# Patient Record
Sex: FEMALE | Race: WHITE | NOT HISPANIC OR LATINO
[De-identification: names, ages, dates, MRNs, and addresses within clinical notes are randomized per-mention and may not be internally consistent; named-entity substitution may affect disease eponyms.]

---

## 2019-07-09 ENCOUNTER — APPOINTMENT (OUTPATIENT)
Dept: ENDOCRINOLOGY | Facility: CLINIC | Age: 44
End: 2019-07-09
Payer: COMMERCIAL

## 2019-07-09 VITALS
WEIGHT: 248 LBS | DIASTOLIC BLOOD PRESSURE: 129 MMHG | HEART RATE: 94 BPM | SYSTOLIC BLOOD PRESSURE: 170 MMHG | BODY MASS INDEX: 43.94 KG/M2 | TEMPERATURE: 98.7 F | HEIGHT: 63 IN | OXYGEN SATURATION: 98 % | RESPIRATION RATE: 12 BRPM

## 2019-07-09 VITALS — DIASTOLIC BLOOD PRESSURE: 80 MMHG | SYSTOLIC BLOOD PRESSURE: 140 MMHG

## 2019-07-09 DIAGNOSIS — Z78.9 OTHER SPECIFIED HEALTH STATUS: ICD-10-CM

## 2019-07-09 DIAGNOSIS — R73.03 PREDIABETES.: ICD-10-CM

## 2019-07-09 DIAGNOSIS — E03.9 HYPOTHYROIDISM, UNSPECIFIED: ICD-10-CM

## 2019-07-09 PROCEDURE — 99244 OFF/OP CNSLTJ NEW/EST MOD 40: CPT

## 2019-07-15 ENCOUNTER — RX RENEWAL (OUTPATIENT)
Age: 44
End: 2019-07-15

## 2019-07-15 DIAGNOSIS — E55.9 VITAMIN D DEFICIENCY, UNSPECIFIED: ICD-10-CM

## 2019-07-15 LAB
25(OH)D3 SERPL-MCNC: 11.7 NG/ML
ALBUMIN SERPL ELPH-MCNC: 4.3 G/DL
ALP BLD-CCNC: 63 U/L
ALT SERPL-CCNC: 25 U/L
ANION GAP SERPL CALC-SCNC: 15 MMOL/L
AST SERPL-CCNC: 33 U/L
BILIRUB SERPL-MCNC: 0.2 MG/DL
BUN SERPL-MCNC: 11 MG/DL
CALCIUM SERPL-MCNC: 9.7 MG/DL
CHLORIDE SERPL-SCNC: 100 MMOL/L
CHOLEST SERPL-MCNC: 225 MG/DL
CHOLEST/HDLC SERPL: 4.2 RATIO
CO2 SERPL-SCNC: 25 MMOL/L
CREAT SERPL-MCNC: 0.74 MG/DL
ESTIMATED AVERAGE GLUCOSE: 134 MG/DL
GLUCOSE SERPL-MCNC: 103 MG/DL
HBA1C MFR BLD HPLC: 6.3 %
HDLC SERPL-MCNC: 54 MG/DL
LDLC SERPL CALC-MCNC: 121 MG/DL
POTASSIUM SERPL-SCNC: 4.3 MMOL/L
PROT SERPL-MCNC: 7.9 G/DL
SODIUM SERPL-SCNC: 140 MMOL/L
T4 FREE SERPL-MCNC: 1 NG/DL
TRIGL SERPL-MCNC: 252 MG/DL
TSH SERPL-ACNC: 3.26 UIU/ML

## 2020-12-09 ENCOUNTER — TRANSCRIPTION ENCOUNTER (OUTPATIENT)
Age: 45
End: 2020-12-09

## 2020-12-09 ENCOUNTER — APPOINTMENT (OUTPATIENT)
Dept: ULTRASOUND IMAGING | Facility: CLINIC | Age: 45
End: 2020-12-09
Payer: COMMERCIAL

## 2020-12-09 ENCOUNTER — OUTPATIENT (OUTPATIENT)
Dept: OUTPATIENT SERVICES | Facility: HOSPITAL | Age: 45
LOS: 1 days | End: 2020-12-09
Payer: COMMERCIAL

## 2020-12-09 ENCOUNTER — RESULT REVIEW (OUTPATIENT)
Age: 45
End: 2020-12-09

## 2020-12-09 DIAGNOSIS — N92.6 IRREGULAR MENSTRUATION, UNSPECIFIED: ICD-10-CM

## 2020-12-09 PROCEDURE — 76831 ECHO EXAM UTERUS: CPT | Mod: 26

## 2020-12-09 PROCEDURE — 58340 CATHETER FOR HYSTEROGRAPHY: CPT

## 2020-12-09 PROCEDURE — 76831 ECHO EXAM UTERUS: CPT

## 2021-07-12 ENCOUNTER — OUTPATIENT (OUTPATIENT)
Dept: OUTPATIENT SERVICES | Facility: HOSPITAL | Age: 46
LOS: 1 days | End: 2021-07-12
Payer: COMMERCIAL

## 2021-07-12 VITALS
TEMPERATURE: 98 F | HEART RATE: 96 BPM | RESPIRATION RATE: 18 BRPM | DIASTOLIC BLOOD PRESSURE: 98 MMHG | OXYGEN SATURATION: 99 % | WEIGHT: 251.11 LBS | HEIGHT: 63 IN | SYSTOLIC BLOOD PRESSURE: 140 MMHG

## 2021-07-12 DIAGNOSIS — Z01.818 ENCOUNTER FOR OTHER PREPROCEDURAL EXAMINATION: ICD-10-CM

## 2021-07-12 DIAGNOSIS — N84.0 POLYP OF CORPUS UTERI: ICD-10-CM

## 2021-07-12 DIAGNOSIS — N92.0 EXCESSIVE AND FREQUENT MENSTRUATION WITH REGULAR CYCLE: ICD-10-CM

## 2021-07-12 DIAGNOSIS — I10 ESSENTIAL (PRIMARY) HYPERTENSION: ICD-10-CM

## 2021-07-12 LAB
BLD GP AB SCN SERPL QL: NEGATIVE — SIGNIFICANT CHANGE UP
HCT VFR BLD CALC: 42.4 % — SIGNIFICANT CHANGE UP (ref 34.5–45)
HGB BLD-MCNC: 13.6 G/DL — SIGNIFICANT CHANGE UP (ref 11.5–15.5)
MCHC RBC-ENTMCNC: 27.1 PG — SIGNIFICANT CHANGE UP (ref 27–34)
MCHC RBC-ENTMCNC: 32.1 GM/DL — SIGNIFICANT CHANGE UP (ref 32–36)
MCV RBC AUTO: 84.6 FL — SIGNIFICANT CHANGE UP (ref 80–100)
NRBC # BLD: 0 /100 WBCS — SIGNIFICANT CHANGE UP (ref 0–0)
PLATELET # BLD AUTO: 343 K/UL — SIGNIFICANT CHANGE UP (ref 150–400)
RBC # BLD: 5.01 M/UL — SIGNIFICANT CHANGE UP (ref 3.8–5.2)
RBC # FLD: 13.3 % — SIGNIFICANT CHANGE UP (ref 10.3–14.5)
RH IG SCN BLD-IMP: POSITIVE — SIGNIFICANT CHANGE UP
WBC # BLD: 8.7 K/UL — SIGNIFICANT CHANGE UP (ref 3.8–10.5)
WBC # FLD AUTO: 8.7 K/UL — SIGNIFICANT CHANGE UP (ref 3.8–10.5)

## 2021-07-12 PROCEDURE — G0463: CPT

## 2021-07-12 PROCEDURE — 86900 BLOOD TYPING SEROLOGIC ABO: CPT

## 2021-07-12 PROCEDURE — 86901 BLOOD TYPING SEROLOGIC RH(D): CPT

## 2021-07-12 PROCEDURE — 85027 COMPLETE CBC AUTOMATED: CPT

## 2021-07-12 PROCEDURE — 86850 RBC ANTIBODY SCREEN: CPT

## 2021-07-12 RX ORDER — SODIUM CHLORIDE 9 MG/ML
3 INJECTION INTRAMUSCULAR; INTRAVENOUS; SUBCUTANEOUS EVERY 8 HOURS
Refills: 0 | Status: DISCONTINUED | OUTPATIENT
Start: 2021-07-15 | End: 2021-07-30

## 2021-07-12 RX ORDER — LIDOCAINE HCL 20 MG/ML
0.2 VIAL (ML) INJECTION ONCE
Refills: 0 | Status: DISCONTINUED | OUTPATIENT
Start: 2021-07-15 | End: 2021-07-30

## 2021-07-12 RX ORDER — CEFOTETAN DISODIUM 1 G
2 VIAL (EA) INJECTION ONCE
Refills: 0 | Status: DISCONTINUED | OUTPATIENT
Start: 2021-07-15 | End: 2021-07-30

## 2021-07-12 NOTE — H&P PST ADULT - ACTIVITY
walking on treadmill or outside walking on treadmill or outside, ADL's, Can walk up a flight of stairs

## 2021-07-12 NOTE — H&P PST ADULT - LAST ECHOCARDIOGRAM
possibly 2 years ago; follows with a cardiologist for HTN and was having palpitations possibly 2 years ago; follows with a cardiologist for HTN and palpitations

## 2021-07-12 NOTE — H&P PST ADULT - RX
unable to tolerate CPAP machine; pt followed up with a dentist who prescribed a nightguard with improvement

## 2021-07-12 NOTE — H&P PST ADULT - NSICDXPASTMEDICALHX_GEN_ALL_CORE_FT
PAST MEDICAL HISTORY:  GERD (gastroesophageal reflux disease)     Gestational Hypertension     Hypertension     Irritable bowel syndrome

## 2021-07-12 NOTE — H&P PST ADULT - HISTORY OF PRESENT ILLNESS
45 year old female with PMH HTN presents with heavy menses with clots for long periods of time x 1.5 years s/p abdominal sonogram and hysteroscopy which revealed an ovarian polyp. Pt scheduled to have a hysteroscopy, D&C and polypectomy removal.    2nd dose Pfizer covid vaccine 5/7/2021  Denies Recent travel, Exposure or Covid symptoms    45 year old female with PMH HTN presents with heavy menses with clots for prolonged duration x 1.5 years s/p abdominal sonogram and hysteroscopy which revealed an ovarian polyp. Pt scheduled to have a hysteroscopy, D&C and polypectomy removal.    2nd dose Pfizer covid vaccine 5/7/2021  Denies Recent travel, Exposure or Covid symptoms

## 2021-07-14 ENCOUNTER — TRANSCRIPTION ENCOUNTER (OUTPATIENT)
Age: 46
End: 2021-07-14

## 2021-07-15 ENCOUNTER — RESULT REVIEW (OUTPATIENT)
Age: 46
End: 2021-07-15

## 2021-07-15 ENCOUNTER — OUTPATIENT (OUTPATIENT)
Dept: INPATIENT UNIT | Facility: HOSPITAL | Age: 46
LOS: 1 days | End: 2021-07-15
Payer: COMMERCIAL

## 2021-07-15 VITALS
RESPIRATION RATE: 16 BRPM | HEIGHT: 63 IN | TEMPERATURE: 99 F | HEART RATE: 101 BPM | DIASTOLIC BLOOD PRESSURE: 89 MMHG | SYSTOLIC BLOOD PRESSURE: 137 MMHG | OXYGEN SATURATION: 100 % | WEIGHT: 251.11 LBS

## 2021-07-15 VITALS
RESPIRATION RATE: 16 BRPM | TEMPERATURE: 98 F | HEART RATE: 86 BPM | OXYGEN SATURATION: 100 % | DIASTOLIC BLOOD PRESSURE: 59 MMHG | SYSTOLIC BLOOD PRESSURE: 113 MMHG

## 2021-07-15 DIAGNOSIS — N93.9 ABNORMAL UTERINE AND VAGINAL BLEEDING, UNSPECIFIED: ICD-10-CM

## 2021-07-15 DIAGNOSIS — N84.0 POLYP OF CORPUS UTERI: ICD-10-CM

## 2021-07-15 DIAGNOSIS — Z01.818 ENCOUNTER FOR OTHER PREPROCEDURAL EXAMINATION: ICD-10-CM

## 2021-07-15 LAB — HCG UR QL: NEGATIVE — SIGNIFICANT CHANGE UP

## 2021-07-15 PROCEDURE — 88305 TISSUE EXAM BY PATHOLOGIST: CPT | Mod: 26

## 2021-07-15 PROCEDURE — 88305 TISSUE EXAM BY PATHOLOGIST: CPT

## 2021-07-15 PROCEDURE — 58558 HYSTEROSCOPY BIOPSY: CPT

## 2021-07-15 PROCEDURE — 81025 URINE PREGNANCY TEST: CPT

## 2021-07-15 RX ORDER — OLMESARTAN MEDOXOMIL 5 MG/1
1 TABLET, FILM COATED ORAL
Qty: 0 | Refills: 0 | DISCHARGE

## 2021-07-15 RX ORDER — ACETAMINOPHEN 500 MG
2 TABLET ORAL
Qty: 0 | Refills: 0 | DISCHARGE

## 2021-07-15 RX ORDER — HYDROMORPHONE HYDROCHLORIDE 2 MG/ML
0.25 INJECTION INTRAMUSCULAR; INTRAVENOUS; SUBCUTANEOUS
Refills: 0 | Status: DISCONTINUED | OUTPATIENT
Start: 2021-07-15 | End: 2021-07-15

## 2021-07-15 RX ORDER — FUROSEMIDE 40 MG
20 TABLET ORAL
Qty: 0 | Refills: 0 | DISCHARGE

## 2021-07-15 RX ORDER — NEBIVOLOL HYDROCHLORIDE 5 MG/1
1 TABLET ORAL
Qty: 0 | Refills: 0 | DISCHARGE

## 2021-07-15 RX ORDER — IBUPROFEN 200 MG
3 TABLET ORAL
Qty: 0 | Refills: 0 | DISCHARGE

## 2021-07-15 NOTE — BRIEF OPERATIVE NOTE - OPERATION/FINDINGS
EUA: anteverted, mobile 7 week uterus. No adnexal masses palpated  Hysteroscopy: fundus and bilateral ostia visualized. Polypoid endometrium appreciated.

## 2021-07-15 NOTE — BRIEF OPERATIVE NOTE - NSICDXBRIEFPOSTOP_GEN_ALL_CORE_FT
POST-OP DIAGNOSIS:  Abnormal uterine bleeding due to endometrial polyp 15-Jul-2021 16:11:13  Heidy Winn

## 2021-07-15 NOTE — BRIEF OPERATIVE NOTE - NSICDXBRIEFPROCEDURE_GEN_ALL_CORE_FT
PROCEDURES:  Dilation and curettage, with uterine polypectomy and video hysteroscopy 15-Jul-2021 16:10:03  Heidy Winn

## 2021-07-15 NOTE — BRIEF OPERATIVE NOTE - NSICDXBRIEFPREOP_GEN_ALL_CORE_FT
PRE-OP DIAGNOSIS:  Abnormal uterine bleeding due to endometrial polyp 15-Jul-2021 16:10:22  Heidy Winn

## 2021-07-15 NOTE — ASU DISCHARGE PLAN (ADULT/PEDIATRIC) - CARE PROVIDER_API CALL
Rogelio Chapman)  Medicine  Critical Care  [address] [facility], [place] [address]  Phone: [phone]  Fax: [phone]  Follow Up Time: 2 weeks

## 2021-07-20 LAB — SURGICAL PATHOLOGY STUDY: SIGNIFICANT CHANGE UP

## 2021-08-06 PROBLEM — K58.9 IRRITABLE BOWEL SYNDROME WITHOUT DIARRHEA: Chronic | Status: ACTIVE | Noted: 2021-07-12

## 2021-08-06 PROBLEM — K21.9 GASTRO-ESOPHAGEAL REFLUX DISEASE WITHOUT ESOPHAGITIS: Chronic | Status: ACTIVE | Noted: 2021-07-12

## 2021-08-06 PROBLEM — I10 ESSENTIAL (PRIMARY) HYPERTENSION: Chronic | Status: ACTIVE | Noted: 2021-07-12

## 2021-08-06 PROBLEM — K58.9 IRRITABLE BOWEL SYNDROME, UNSPECIFIED: Chronic | Status: ACTIVE | Noted: 2021-07-12

## 2021-08-19 ENCOUNTER — APPOINTMENT (OUTPATIENT)
Dept: PULMONOLOGY | Facility: CLINIC | Age: 46
End: 2021-08-19
Payer: COMMERCIAL

## 2021-08-19 VITALS
SYSTOLIC BLOOD PRESSURE: 119 MMHG | OXYGEN SATURATION: 100 % | HEIGHT: 63 IN | HEART RATE: 95 BPM | TEMPERATURE: 97 F | WEIGHT: 248 LBS | RESPIRATION RATE: 16 BRPM | BODY MASS INDEX: 43.94 KG/M2 | DIASTOLIC BLOOD PRESSURE: 80 MMHG

## 2021-08-19 DIAGNOSIS — Z87.01 PERSONAL HISTORY OF PNEUMONIA (RECURRENT): ICD-10-CM

## 2021-08-19 DIAGNOSIS — Z86.39 PERSONAL HISTORY OF OTHER ENDOCRINE, NUTRITIONAL AND METABOLIC DISEASE: ICD-10-CM

## 2021-08-19 DIAGNOSIS — Z86.79 PERSONAL HISTORY OF OTHER DISEASES OF THE CIRCULATORY SYSTEM: ICD-10-CM

## 2021-08-19 PROCEDURE — 99204 OFFICE O/P NEW MOD 45 MIN: CPT

## 2021-08-19 RX ORDER — NEBIVOLOL HYDROCHLORIDE 20 MG/1
20 TABLET ORAL DAILY
Refills: 0 | Status: COMPLETED | COMMUNITY
Start: 2019-07-09 | End: 2021-08-19

## 2021-08-19 NOTE — DISCUSSION/SUMMARY
[FreeTextEntry1] : She is a 46 year old woman with a history of JENNA diagnosed many years ago. She could not tolerate CPAP and was given a MAD by [facility]. Was told she needs a new MAD and should have a PSG. \par \par Will obtain a PSG. \par \par Further recommendations to follow the results of the above. \par \par \par \par

## 2021-08-19 NOTE — REVIEW OF SYSTEMS
[Thyroid Problem] : thyroid problem [Fatigue] : no fatigue [Sinus Problems] : no sinus problems [Dyspnea] : no dyspnea [Chest Discomfort] : no chest discomfort [Edema] : no edema [Nasal Discharge] : no nasal discharge [GERD] : no gerd [Rash] : no rash [Back Pain] : no back pain [Anemia] : no anemia [Headache] : no headache [Diabetes] : no diabetes

## 2021-08-19 NOTE — HISTORY OF PRESENT ILLNESS
[Never] : never [Obstructive Sleep Apnea] : obstructive sleep apnea [Fatigue] : fatigue [Oral Appliance] : oral appliance [TextBox_4] : Has h/o JENNA diagnosed many years ago. Could not tolerate CPAP. Has MAD by [facility]. Was told she needs a new MAD and should have a PSG. \par \par \par \par  [Difficulty Maintaining Sleep] : does not have difficulty maintaining sleep

## 2021-08-19 NOTE — PHYSICAL EXAM
[No Acute Distress] : no acute distress [II] : Mallampati Class: II [No Neck Mass] : no neck mass [Normal S1, S2] : normal s1, s2 [Clear to Auscultation Bilaterally] : clear to auscultation bilaterally [No HSM] : no hsm [Normal Gait] : normal gait [No Cyanosis] : no cyanosis [Normal Turgor] : normal turgor [No Focal Deficits] : no focal deficits [Oriented x3] : oriented x3

## 2022-03-31 ENCOUNTER — NON-APPOINTMENT (OUTPATIENT)
Age: 47
End: 2022-03-31

## 2022-04-01 ENCOUNTER — APPOINTMENT (OUTPATIENT)
Dept: PULMONOLOGY | Facility: CLINIC | Age: 47
End: 2022-04-01
Payer: COMMERCIAL

## 2022-04-01 VITALS
SYSTOLIC BLOOD PRESSURE: 132 MMHG | BODY MASS INDEX: 40.92 KG/M2 | OXYGEN SATURATION: 99 % | WEIGHT: 231 LBS | TEMPERATURE: 96.1 F | HEART RATE: 84 BPM | DIASTOLIC BLOOD PRESSURE: 84 MMHG

## 2022-04-01 PROCEDURE — 99215 OFFICE O/P EST HI 40 MIN: CPT

## 2022-04-01 RX ORDER — OLMESARTAN MEDOXOMIL 40 MG/1
TABLET, FILM COATED ORAL
Refills: 0 | Status: COMPLETED | COMMUNITY
End: 2022-04-01

## 2022-04-01 RX ORDER — NEBIVOLOL HYDROCHLORIDE 20 MG/1
20 TABLET ORAL
Refills: 0 | Status: ACTIVE | COMMUNITY

## 2022-04-01 NOTE — DISCUSSION/SUMMARY
[FreeTextEntry1] : She is a 46 year old woman with a history of JENNA diagnosed many years ago. She could not tolerate CPAP and was given a MAD by [facility]. Was told she needs a new MAD and should have a PSG. \par \par Was advised PSG but could not have one. Will obtain HSAT.\par \par Weight loss and exercise were advised. \par \par Further recommendations to follow the results of the above. \par \par \par \par \par \par \par

## 2022-04-01 NOTE — PHYSICAL EXAM
[No Acute Distress] : no acute distress [No Neck Mass] : no neck mass [Normal S1, S2] : normal s1, s2 [Clear to Auscultation Bilaterally] : clear to auscultation bilaterally [No HSM] : no hsm [Normal Gait] : normal gait [No Cyanosis] : no cyanosis [Normal Turgor] : normal turgor [No Focal Deficits] : no focal deficits [Oriented x3] : oriented x3 [III] : Mallampati Class: III

## 2022-04-01 NOTE — REVIEW OF SYSTEMS
[Fatigue] : no fatigue [Sinus Problems] : no sinus problems [Dyspnea] : no dyspnea [Chest Discomfort] : no chest discomfort [Edema] : no edema [Nasal Discharge] : no nasal discharge [GERD] : no gerd [Back Pain] : no back pain [Rash] : no rash [Anemia] : no anemia [Headache] : no headache [Anxiety] : no anxiety [Diabetes] : no diabetes

## 2022-04-01 NOTE — HISTORY OF PRESENT ILLNESS
[Never] : never [Obstructive Sleep Apnea] : obstructive sleep apnea [Fatigue] : fatigue [Oral Appliance] : oral appliance [Snoring] : snoring [TextBox_4] : Has h/o JENNA diagnosed many years ago. Could not tolerate CPAP. Has MAD by [facility]. Was told she needs a new MAD and should have a PSG. \par \par Did not have sleep study. \par \par Has not been using her MAD. Needs a new one. \par \par Feels sleepy during the day. Also snores. \par \par \par \par \par \par  [Difficulty Maintaining Sleep] : does not have difficulty maintaining sleep [Tired while Driving] : not tired while driving

## 2022-07-21 ENCOUNTER — APPOINTMENT (OUTPATIENT)
Dept: PULMONOLOGY | Facility: CLINIC | Age: 47
End: 2022-07-21

## 2022-07-21 VITALS
WEIGHT: 231 LBS | BODY MASS INDEX: 40.92 KG/M2 | DIASTOLIC BLOOD PRESSURE: 98 MMHG | SYSTOLIC BLOOD PRESSURE: 160 MMHG | HEART RATE: 94 BPM | TEMPERATURE: 97.1 F | OXYGEN SATURATION: 97 %

## 2022-07-21 VITALS — SYSTOLIC BLOOD PRESSURE: 146 MMHG | DIASTOLIC BLOOD PRESSURE: 92 MMHG

## 2022-07-21 PROCEDURE — 94727 GAS DIL/WSHOT DETER LNG VOL: CPT

## 2022-07-21 PROCEDURE — 94060 EVALUATION OF WHEEZING: CPT

## 2022-07-21 PROCEDURE — 99214 OFFICE O/P EST MOD 30 MIN: CPT | Mod: 25

## 2022-07-21 PROCEDURE — 94729 DIFFUSING CAPACITY: CPT

## 2022-07-21 RX ORDER — OLMESARTAN MEDOXOMIL AND HYDROCHLOROTHIAZIDE 40; 12.5 MG/1; MG/1
40-12.5 TABLET ORAL
Qty: 30 | Refills: 0 | Status: ACTIVE | COMMUNITY
Start: 2022-06-23

## 2022-07-21 RX ORDER — OLMESARTAN MEDOXOMIL 40 MG/1
40 TABLET, FILM COATED ORAL
Refills: 0 | Status: DISCONTINUED | COMMUNITY
End: 2022-07-21

## 2022-07-21 NOTE — PHYSICAL EXAM
[No Acute Distress] : no acute distress [III] : Mallampati Class: III [No Neck Mass] : no neck mass [Normal S1, S2] : normal s1, s2 [Clear to Auscultation Bilaterally] : clear to auscultation bilaterally [No HSM] : no hsm [Normal Gait] : normal gait [No Cyanosis] : no cyanosis [Normal Turgor] : normal turgor [No Focal Deficits] : no focal deficits [Oriented x3] : oriented x3 [No Edema] : no edema [Normal Affect] : normal affect

## 2022-07-21 NOTE — REVIEW OF SYSTEMS
[Fever] : no fever [Fatigue] : no fatigue [Nasal Congestion] : no nasal congestion [Sinus Problems] : no sinus problems [Chest Discomfort] : no chest discomfort [Dyspnea] : no dyspnea [Edema] : no edema [Nasal Discharge] : no nasal discharge [GERD] : no gerd [Myalgias] : no myalgias [Back Pain] : no back pain [Rash] : no rash [Anemia] : no anemia [Headache] : no headache [Anxiety] : no anxiety [Diabetes] : no diabetes

## 2022-07-21 NOTE — HISTORY OF PRESENT ILLNESS
[Never] : never [Obstructive Sleep Apnea] : obstructive sleep apnea [Fatigue] : fatigue [Snoring] : snoring [Oral Appliance] : oral appliance [TextBox_4] : She is a 46 year old woman with a history of JENNA diagnosed many years ago. She could not tolerate CPAP and was given a MAD by [facility]. It became damaged and she needs a new one. \par \par \par \par  [Difficulty Maintaining Sleep] : does not have difficulty maintaining sleep [Tired while Driving] : not tired while driving

## 2022-07-21 NOTE — DISCUSSION/SUMMARY
[FreeTextEntry1] : She is a 46 year old woman with a history of JENNA diagnosed many years ago. She could not tolerate CPAP and was given a MAD by [facility]. \par \par HSAT 6/26/22: MARIELENA 23.8 consistent with moderate JENNA. She has been intolerant of CPAP. \par \par To renew MAD from [facility]. \par \par \par

## 2023-09-18 ENCOUNTER — NON-APPOINTMENT (OUTPATIENT)
Age: 48
End: 2023-09-18

## 2023-09-20 ENCOUNTER — NON-APPOINTMENT (OUTPATIENT)
Age: 48
End: 2023-09-20

## 2023-09-20 ENCOUNTER — APPOINTMENT (OUTPATIENT)
Dept: PODIATRY | Facility: CLINIC | Age: 48
End: 2023-09-20
Payer: COMMERCIAL

## 2023-09-20 DIAGNOSIS — M20.40 OTHER HAMMER TOE(S) (ACQUIRED), UNSPECIFIED FOOT: ICD-10-CM

## 2023-09-20 DIAGNOSIS — G62.9 POLYNEUROPATHY, UNSPECIFIED: ICD-10-CM

## 2023-09-20 DIAGNOSIS — M72.2 PLANTAR FASCIAL FIBROMATOSIS: ICD-10-CM

## 2023-09-20 DIAGNOSIS — M25.571 PAIN IN RIGHT ANKLE AND JOINTS OF RIGHT FOOT: ICD-10-CM

## 2023-09-20 DIAGNOSIS — M79.674 PAIN IN RIGHT TOE(S): ICD-10-CM

## 2023-09-20 DIAGNOSIS — M25.572 PAIN IN LEFT ANKLE AND JOINTS OF LEFT FOOT: ICD-10-CM

## 2023-09-20 PROCEDURE — 73630 X-RAY EXAM OF FOOT: CPT | Mod: RT

## 2023-09-20 PROCEDURE — 73610 X-RAY EXAM OF ANKLE: CPT | Mod: LT

## 2023-09-20 PROCEDURE — 99213 OFFICE O/P EST LOW 20 MIN: CPT

## 2023-09-20 RX ORDER — MELOXICAM 15 MG/1
15 TABLET ORAL DAILY
Qty: 14 | Refills: 1 | Status: ACTIVE | COMMUNITY
Start: 2023-09-20 | End: 1900-01-01

## 2023-09-20 RX ORDER — SEMAGLUTIDE 1.34 MG/ML
INJECTION, SOLUTION SUBCUTANEOUS
Refills: 0 | Status: ACTIVE | COMMUNITY

## 2023-09-27 PROBLEM — M20.40 HAMMERTOE: Status: ACTIVE | Noted: 2023-09-22

## 2023-09-27 PROBLEM — G62.9 NEUROPATHY: Status: ACTIVE | Noted: 2023-09-22

## 2023-09-27 PROBLEM — M25.572 ACUTE LEFT ANKLE PAIN: Status: ACTIVE | Noted: 2023-09-22

## 2023-09-27 PROBLEM — M79.674 PAIN OF TOE OF RIGHT FOOT: Status: ACTIVE | Noted: 2023-09-22

## 2023-09-27 PROBLEM — M25.571 ACUTE RIGHT ANKLE PAIN: Status: ACTIVE | Noted: 2023-09-22

## 2023-10-04 ENCOUNTER — APPOINTMENT (OUTPATIENT)
Dept: PODIATRY | Facility: CLINIC | Age: 48
End: 2023-10-04

## 2023-10-27 ENCOUNTER — APPOINTMENT (OUTPATIENT)
Dept: PULMONOLOGY | Facility: CLINIC | Age: 48
End: 2023-10-27
Payer: COMMERCIAL

## 2023-10-27 VITALS
WEIGHT: 230 LBS | SYSTOLIC BLOOD PRESSURE: 138 MMHG | OXYGEN SATURATION: 95 % | DIASTOLIC BLOOD PRESSURE: 100 MMHG | HEART RATE: 100 BPM | TEMPERATURE: 97.3 F | BODY MASS INDEX: 40.74 KG/M2

## 2023-10-27 DIAGNOSIS — G47.30 SLEEP APNEA, UNSPECIFIED: ICD-10-CM

## 2023-10-27 PROCEDURE — 99214 OFFICE O/P EST MOD 30 MIN: CPT

## 2024-01-10 ENCOUNTER — APPOINTMENT (OUTPATIENT)
Dept: PULMONOLOGY | Facility: CLINIC | Age: 49
End: 2024-01-10
Payer: COMMERCIAL

## 2024-01-10 VITALS
SYSTOLIC BLOOD PRESSURE: 118 MMHG | WEIGHT: 227 LBS | BODY MASS INDEX: 40.21 KG/M2 | OXYGEN SATURATION: 99 % | HEART RATE: 86 BPM | DIASTOLIC BLOOD PRESSURE: 80 MMHG | TEMPERATURE: 96 F

## 2024-01-10 VITALS — SYSTOLIC BLOOD PRESSURE: 112 MMHG | DIASTOLIC BLOOD PRESSURE: 80 MMHG

## 2024-01-10 DIAGNOSIS — I10 ESSENTIAL (PRIMARY) HYPERTENSION: ICD-10-CM

## 2024-01-10 PROCEDURE — 99213 OFFICE O/P EST LOW 20 MIN: CPT

## 2024-01-10 NOTE — PROCEDURE
[FreeTextEntry1] : HSAT 6/26/22: MARIELENA 23.8.   HSAT 12/6/23: MARIELENA 20.2 - done with oral appliance in place  PFT 7/21/22: Spirometry was normal. Lung volumes were normal. Diffusion capacity was normal. No significant improvement after bronchodilator.

## 2024-01-10 NOTE — REVIEW OF SYSTEMS
[Fever] : no fever [Fatigue] : no fatigue [Nasal Congestion] : no nasal congestion [Sinus Problems] : no sinus problems [Cough] : no cough [Dyspnea] : no dyspnea [Chest Discomfort] : no chest discomfort [Edema] : no edema [Nasal Discharge] : no nasal discharge [GERD] : no gerd [Myalgias] : no myalgias [Back Pain] : no back pain [Rash] : no rash [Anemia] : no anemia [Headache] : no headache [Anxiety] : no anxiety [Diabetes] : no diabetes

## 2024-01-10 NOTE — PHYSICAL EXAM
[No Acute Distress] : no acute distress [III] : Mallampati Class: III [Normal Appearance] : normal appearance [Normal Rate/Rhythm] : normal rate/rhythm [Normal S1, S2] : normal s1, s2 [Clear to Auscultation Bilaterally] : clear to auscultation bilaterally [No HSM] : no hsm [Normal Gait] : normal gait [No Cyanosis] : no cyanosis [No Edema] : no edema [Normal Turgor] : normal turgor [No Focal Deficits] : no focal deficits [Oriented x3] : oriented x3 [Normal Affect] : normal affect

## 2024-01-10 NOTE — HISTORY OF PRESENT ILLNESS
[Never] : never [Obstructive Sleep Apnea] : obstructive sleep apnea [Fatigue] : fatigue [Snoring] : snoring [Oral Appliance] : oral appliance [TextBox_4] : She is a 48 year old woman with a history of JENNA diagnosed many years ago. She could not tolerate CPAP and was given a MAD by [facility].   Had HSAT on 12/6 with the MAD in place.  [Difficulty Maintaining Sleep] : does not have difficulty maintaining sleep [Tired while Driving] : not tired while driving

## 2024-01-10 NOTE — DISCUSSION/SUMMARY
[FreeTextEntry1] : She is a 48 year old woman with a history of JENNA diagnosed many years ago. She could not tolerate CPAP and was given a MAD by [facility]. HSAT 6/26/22: MARIELENA 23.8 consistent with moderate JENNA. She has been intolerant of CPAP.   Impression Moderate JENNA on the basis of HSAT -MARIELENA was similar with oral appliance in place Hypertension -Requiring multiple medications to control Elevated BMI  Recommend Auto CPAP -She would like to try CPAP again with nasal pillow mask Weight loss and exercise Home blood pressure monitoring advised I will see her again after CPAP has been initiated

## 2024-03-27 ENCOUNTER — APPOINTMENT (OUTPATIENT)
Dept: PULMONOLOGY | Facility: CLINIC | Age: 49
End: 2024-03-27
Payer: COMMERCIAL

## 2024-03-27 VITALS
WEIGHT: 231 LBS | BODY MASS INDEX: 40.92 KG/M2 | DIASTOLIC BLOOD PRESSURE: 94 MMHG | TEMPERATURE: 97.3 F | OXYGEN SATURATION: 99 % | HEART RATE: 90 BPM | SYSTOLIC BLOOD PRESSURE: 128 MMHG

## 2024-03-27 DIAGNOSIS — G47.33 OBSTRUCTIVE SLEEP APNEA (ADULT) (PEDIATRIC): ICD-10-CM

## 2024-03-27 PROCEDURE — 99214 OFFICE O/P EST MOD 30 MIN: CPT

## 2024-03-27 NOTE — PROCEDURE
[FreeTextEntry1] : HSAT 6/26/22: MARIELENA 23.8.   HSAT 12/6/23: MARIELENA 20.2 done with oral appliance in place.  Consistent with moderate JENNA.  PFT 7/21/22: Spirometry was normal. Lung volumes were normal. Diffusion capacity was normal. No significant improvement after bronchodilator.

## 2024-03-27 NOTE — DISCUSSION/SUMMARY
[FreeTextEntry1] : She is a 48 year old woman with a history of JENNA diagnosed many years ago. She could not tolerate CPAP and was given an oral appliance by [facility]. HSAT 6/26/22: MARIELENA 23.8 consistent with moderate JENNA. She has been intolerant of CPAP.   Impression Moderate JENNA on the basis of HSAT -Failed therapy with an oral appliance Hypertension Elevated BMI  Recommend Continue with CPAP via nasal pillow mask -She is on auto CPAP of 4 to 10 cm of water Follow-up with primary care regarding blood pressure management Weight loss and exercise are advisable Follow-up in 6 months, sooner if need be

## 2024-03-27 NOTE — HISTORY OF PRESENT ILLNESS
[Never] : never [Obstructive Sleep Apnea] : obstructive sleep apnea [Oral Appliance] : oral appliance [TextBox_4] : She is a 48 year old woman with a history of JENNA diagnosed many years ago. She could not tolerate CPAP and was given a MAD by [facility].   She was started on CPAP again.  Has a nasal pillow mask.  She has been learning how to use it.

## 2024-03-27 NOTE — PHYSICAL EXAM
[No Acute Distress] : no acute distress [III] : Mallampati Class: III [Normal Appearance] : normal appearance [Normal S1, S2] : normal s1, s2 [Normal Rate/Rhythm] : normal rate/rhythm [No HSM] : no hsm [Clear to Auscultation Bilaterally] : clear to auscultation bilaterally [No Cyanosis] : no cyanosis [Normal Gait] : normal gait [Normal Turgor] : normal turgor [No Edema] : no edema [No Focal Deficits] : no focal deficits [Oriented x3] : oriented x3 [Normal Affect] : normal affect [No Resp Distress] : no resp distress [Benign] : benign

## 2024-03-27 NOTE — REVIEW OF SYSTEMS
[Fever] : no fever [Fatigue] : no fatigue [Nasal Congestion] : no nasal congestion [Cough] : no cough [Dyspnea] : no dyspnea [Chest Discomfort] : no chest discomfort [Nasal Discharge] : no nasal discharge [Edema] : no edema [GERD] : no gerd [Myalgias] : no myalgias [Back Pain] : no back pain [Rash] : no rash [Anemia] : no anemia [Headache] : no headache [Anxiety] : no anxiety [Diabetes] : no diabetes

## 2024-08-28 ENCOUNTER — APPOINTMENT (OUTPATIENT)
Dept: PULMONOLOGY | Facility: CLINIC | Age: 49
End: 2024-08-28

## 2024-09-08 ENCOUNTER — NON-APPOINTMENT (OUTPATIENT)
Age: 49
End: 2024-09-08

## 2024-09-09 ENCOUNTER — APPOINTMENT (OUTPATIENT)
Dept: PULMONOLOGY | Facility: CLINIC | Age: 49
End: 2024-09-09
Payer: COMMERCIAL

## 2024-09-09 VITALS
DIASTOLIC BLOOD PRESSURE: 70 MMHG | BODY MASS INDEX: 38.97 KG/M2 | SYSTOLIC BLOOD PRESSURE: 102 MMHG | TEMPERATURE: 96.6 F | HEART RATE: 97 BPM | OXYGEN SATURATION: 98 % | WEIGHT: 220 LBS

## 2024-09-09 DIAGNOSIS — G47.33 OBSTRUCTIVE SLEEP APNEA (ADULT) (PEDIATRIC): ICD-10-CM

## 2024-09-09 DIAGNOSIS — G47.30 SLEEP APNEA, UNSPECIFIED: ICD-10-CM

## 2024-09-09 PROCEDURE — 99214 OFFICE O/P EST MOD 30 MIN: CPT

## 2024-09-09 NOTE — PHYSICAL EXAM
[No Acute Distress] : no acute distress [III] : Mallampati Class: III [Normal Appearance] : normal appearance [Normal Rate/Rhythm] : normal rate/rhythm [Normal S1, S2] : normal s1, s2 [No Resp Distress] : no resp distress [Clear to Auscultation Bilaterally] : clear to auscultation bilaterally [Benign] : benign [No HSM] : no hsm [Normal Gait] : normal gait [No Cyanosis] : no cyanosis [No Edema] : no edema [Normal Turgor] : normal turgor [No Focal Deficits] : no focal deficits [Oriented x3] : oriented x3 [Normal Affect] : normal affect

## 2024-09-09 NOTE — HISTORY OF PRESENT ILLNESS
[Never] : never [Obstructive Sleep Apnea] : obstructive sleep apnea [Oral Appliance] : oral appliance [TextBox_4] : She is using her CPAP every night.  Since that time her blood pressure medicine requirement has diminished.  She sleeps about 4 to 5 hours a night with the machine.  She wakes up to go to the bathroom.  She takes a diuretic in the evening.  She has lost weight.  She is on Ozempic.

## 2024-09-09 NOTE — DISCUSSION/SUMMARY
[FreeTextEntry1] : Impression Moderate JENNA  -Doing well on auto CPAP of 4 to 10 cm of water -Failed therapy with an oral appliance Hypertension -Improved after starting on CPAP -Patient has a BP diary on her phone which indicates this Elevated BMI  Recommend Continue with CPAP via nasal pillow mask -auto CPAP of 4 to 10 cm of water Weight loss and exercise are advisable Follow-up in 6 months

## 2024-09-09 NOTE — REVIEW OF SYSTEMS
[Fatigue] : no fatigue [Nasal Congestion] : no nasal congestion [Cough] : no cough [Chest Tightness] : no chest tightness [Dyspnea] : no dyspnea [Chest Discomfort] : no chest discomfort [Edema] : no edema [Nasal Discharge] : no nasal discharge [GERD] : no gerd [Myalgias] : no myalgias [Back Pain] : no back pain [Rash] : no rash [Anemia] : no anemia [Headache] : no headache [Anxiety] : no anxiety [Diabetes] : no diabetes

## 2024-12-02 NOTE — H&P PST ADULT - NSICDXPROBLEM_GEN_ALL_CORE_FT
PROBLEM DIAGNOSES  Problem: Heavy menstrual bleeding  Assessment and Plan: Pt scheduled for a D&C, diagnostic hysteroscopy and polypectomy    Problem: Hypertension  Assessment and Plan: Continue anti-hypertensive medications day of surgery       Quality 226: Preventive Care And Screening: Tobacco Use: Screening And Cessation Intervention: Patient screened for tobacco use and is an ex/non-smoker Detail Level: Detailed Quality 130: Documentation Of Current Medications In The Medical Record: Current Medications Documented Quality 431: Preventive Care And Screening: Unhealthy Alcohol Use - Screening: Patient not identified as an unhealthy alcohol user when screened for unhealthy alcohol use using a systematic screening method

## 2025-03-07 ENCOUNTER — OUTPATIENT (OUTPATIENT)
Dept: OUTPATIENT SERVICES | Facility: HOSPITAL | Age: 50
LOS: 1 days | End: 2025-03-07
Payer: COMMERCIAL

## 2025-03-07 VITALS
HEIGHT: 62 IN | RESPIRATION RATE: 18 BRPM | SYSTOLIC BLOOD PRESSURE: 105 MMHG | OXYGEN SATURATION: 96 % | TEMPERATURE: 98 F | HEART RATE: 86 BPM | WEIGHT: 225.97 LBS | DIASTOLIC BLOOD PRESSURE: 76 MMHG

## 2025-03-07 DIAGNOSIS — Z01.818 ENCOUNTER FOR OTHER PREPROCEDURAL EXAMINATION: ICD-10-CM

## 2025-03-07 DIAGNOSIS — N75.0 CYST OF BARTHOLIN'S GLAND: ICD-10-CM

## 2025-03-07 DIAGNOSIS — N84.0 POLYP OF CORPUS UTERI: ICD-10-CM

## 2025-03-07 DIAGNOSIS — R63.4 ABNORMAL WEIGHT LOSS: ICD-10-CM

## 2025-03-07 DIAGNOSIS — N84.0 POLYP OF CORPUS UTERI: Chronic | ICD-10-CM

## 2025-03-07 PROCEDURE — 80048 BASIC METABOLIC PNL TOTAL CA: CPT

## 2025-03-07 PROCEDURE — 85027 COMPLETE CBC AUTOMATED: CPT

## 2025-03-07 PROCEDURE — G0463: CPT

## 2025-03-07 NOTE — H&P PST ADULT - NSICDXPASTMEDICALHX_GEN_ALL_CORE_FT
PAST MEDICAL HISTORY:  GERD (gastroesophageal reflux disease)     Gestational Hypertension     Hyperlipemia     Hypertension     Irritable bowel syndrome     Lumbar herniated disc     Obesity, morbid, BMI 40.0-49.9     JENNA on CPAP

## 2025-03-07 NOTE — H&P PST ADULT - PROBLEM SELECTOR PLAN 1
coming in for D&C operative hysteroscopy with [facility]. Bartholin cyst incision and drainage. Ultrasound guidance

## 2025-03-07 NOTE — H&P PST ADULT - ATTENDING COMMENTS
50yo P1 w polyps for hysteroscopy polypectomy sono guidance available, pt says cyst much smaller in vulva, plan for EUA and I&D pending findings, if small or resolved will fu in office

## 2025-03-07 NOTE — H&P PST ADULT - HISTORY OF PRESENT ILLNESS
49 yr old female  with polyp in uterus and Bartholin's cyst. Pt had polyps removed in . Pt denies irreg bleeding bloating or cramping. Now coming in for D&C operative hysteroscopy with [facility]. Bartholin's cyst I&D. Ultrasound guidance on 3/21/2025. HX HTN HLD JENNA obesity. 49 yr old female  with polyp in uterus and Bartholin's cyst. Pt had polyps removed in . Pt denies irreg bleeding bloating or cramping. Now coming in for D&C operative hysteroscopy with [facility]. Bartholin's cyst I&D. Ultrasound guidance on 3/21/2025. HX HTN HLD JENNA obesity wt loss 40 lbs.

## 2025-03-10 ENCOUNTER — NON-APPOINTMENT (OUTPATIENT)
Age: 50
End: 2025-03-10

## 2025-03-10 ENCOUNTER — APPOINTMENT (OUTPATIENT)
Dept: PULMONOLOGY | Facility: CLINIC | Age: 50
End: 2025-03-10
Payer: COMMERCIAL

## 2025-03-10 VITALS
BODY MASS INDEX: 39.69 KG/M2 | TEMPERATURE: 97.6 F | DIASTOLIC BLOOD PRESSURE: 80 MMHG | HEART RATE: 98 BPM | HEIGHT: 63 IN | OXYGEN SATURATION: 97 % | SYSTOLIC BLOOD PRESSURE: 126 MMHG | WEIGHT: 224 LBS

## 2025-03-10 DIAGNOSIS — G47.33 OBSTRUCTIVE SLEEP APNEA (ADULT) (PEDIATRIC): ICD-10-CM

## 2025-03-10 PROBLEM — E66.01 MORBID (SEVERE) OBESITY DUE TO EXCESS CALORIES: Chronic | Status: ACTIVE | Noted: 2025-03-07

## 2025-03-10 PROBLEM — M51.26 OTHER INTERVERTEBRAL DISC DISPLACEMENT, LUMBAR REGION: Chronic | Status: ACTIVE | Noted: 2025-03-07

## 2025-03-10 PROBLEM — E78.5 HYPERLIPIDEMIA, UNSPECIFIED: Chronic | Status: ACTIVE | Noted: 2025-03-07

## 2025-03-10 PROCEDURE — 99214 OFFICE O/P EST MOD 30 MIN: CPT

## 2025-03-10 RX ORDER — VALSARTAN AND HYDROCHLOROTHIAZIDE 80; 12.5 MG/1; MG/1
80-12.5 TABLET, FILM COATED ORAL
Refills: 0 | Status: ACTIVE | COMMUNITY

## 2025-03-10 RX ORDER — ROSUVASTATIN CALCIUM 5 MG/1
5 TABLET, FILM COATED ORAL
Refills: 0 | Status: ACTIVE | COMMUNITY

## 2025-03-10 RX ORDER — ONDANSETRON 8 MG/1
8 TABLET, ORALLY DISINTEGRATING ORAL
Refills: 0 | Status: ACTIVE | COMMUNITY

## 2025-03-10 RX ORDER — NEBIVOLOL 20 MG/1
20 TABLET ORAL
Refills: 0 | Status: ACTIVE | COMMUNITY

## 2025-03-10 RX ORDER — SEMAGLUTIDE 2.68 MG/ML
INJECTION, SOLUTION SUBCUTANEOUS
Refills: 0 | Status: ACTIVE | COMMUNITY

## 2025-03-21 ENCOUNTER — OUTPATIENT (OUTPATIENT)
Dept: OUTPATIENT SERVICES | Facility: HOSPITAL | Age: 50
LOS: 1 days | End: 2025-03-21
Payer: COMMERCIAL

## 2025-03-21 ENCOUNTER — RESULT REVIEW (OUTPATIENT)
Age: 50
End: 2025-03-21

## 2025-03-21 ENCOUNTER — TRANSCRIPTION ENCOUNTER (OUTPATIENT)
Age: 50
End: 2025-03-21

## 2025-03-21 VITALS
HEART RATE: 98 BPM | SYSTOLIC BLOOD PRESSURE: 155 MMHG | HEIGHT: 62 IN | RESPIRATION RATE: 16 BRPM | WEIGHT: 225.97 LBS | OXYGEN SATURATION: 100 % | DIASTOLIC BLOOD PRESSURE: 90 MMHG | TEMPERATURE: 98 F

## 2025-03-21 VITALS
DIASTOLIC BLOOD PRESSURE: 57 MMHG | SYSTOLIC BLOOD PRESSURE: 117 MMHG | RESPIRATION RATE: 18 BRPM | HEART RATE: 80 BPM | TEMPERATURE: 97 F | OXYGEN SATURATION: 94 %

## 2025-03-21 DIAGNOSIS — N84.0 POLYP OF CORPUS UTERI: ICD-10-CM

## 2025-03-21 DIAGNOSIS — N75.0 CYST OF BARTHOLIN'S GLAND: ICD-10-CM

## 2025-03-21 DIAGNOSIS — N84.0 POLYP OF CORPUS UTERI: Chronic | ICD-10-CM

## 2025-03-21 PROCEDURE — 88305 TISSUE EXAM BY PATHOLOGIST: CPT

## 2025-03-21 PROCEDURE — 76998 US GUIDE INTRAOP: CPT

## 2025-03-21 PROCEDURE — 58558 HYSTEROSCOPY BIOPSY: CPT

## 2025-03-21 PROCEDURE — 88305 TISSUE EXAM BY PATHOLOGIST: CPT | Mod: 26

## 2025-03-21 DEVICE — AVETA SMOL RESECTING DEVICE 2.9MM: Type: IMPLANTABLE DEVICE | Status: FUNCTIONAL

## 2025-03-21 DEVICE — AVETA WAVE PLUS RESECTING DEVICE 3.9MM: Type: IMPLANTABLE DEVICE | Status: FUNCTIONAL

## 2025-03-21 DEVICE — AVETA FLEX RESECTING DEVICE 2.9MM: Type: IMPLANTABLE DEVICE | Status: FUNCTIONAL

## 2025-03-21 RX ORDER — LIDOCAINE HCL/PF 10 MG/ML
0.2 VIAL (ML) INJECTION ONCE
Refills: 0 | Status: COMPLETED | OUTPATIENT
Start: 2025-03-21 | End: 2025-03-21

## 2025-03-21 RX ORDER — ORAL SEMAGLUTIDE 14 MG/1
0 TABLET ORAL
Refills: 0 | DISCHARGE

## 2025-03-21 RX ORDER — NEBIVOLOL 2.5 MG/1
1 TABLET ORAL
Refills: 0 | DISCHARGE

## 2025-03-21 RX ORDER — ONDANSETRON HCL/PF 4 MG/2 ML
1 VIAL (ML) INJECTION
Refills: 0 | DISCHARGE

## 2025-03-21 RX ORDER — ROSUVASTATIN CALCIUM 20 MG/1
1 TABLET, FILM COATED ORAL
Refills: 0 | DISCHARGE

## 2025-03-21 RX ORDER — FENTANYL CITRATE-0.9 % NACL/PF 100MCG/2ML
25 SYRINGE (ML) INTRAVENOUS
Refills: 0 | Status: DISCONTINUED | OUTPATIENT
Start: 2025-03-21 | End: 2025-03-21

## 2025-03-21 RX ORDER — SODIUM CHLORIDE 9 G/1000ML
1000 INJECTION, SOLUTION INTRAVENOUS
Refills: 0 | Status: ACTIVE | OUTPATIENT
Start: 2025-03-21 | End: 2026-02-17

## 2025-03-21 RX ORDER — ONDANSETRON HCL/PF 4 MG/2 ML
4 VIAL (ML) INJECTION ONCE
Refills: 0 | Status: ACTIVE | OUTPATIENT
Start: 2025-03-21 | End: 2026-02-17

## 2025-03-21 RX ADMIN — SODIUM CHLORIDE 100 MILLILITER(S): 9 INJECTION, SOLUTION INTRAVENOUS at 10:50

## 2025-03-21 NOTE — ASU DISCHARGE PLAN (ADULT/PEDIATRIC) - CARE PROVIDER_API CALL
Rogelio Chapman  Obstetrics and Gynecology  [address], [address]  [place] [address]  Phone: [phone]  Fax: [phone]  Follow Up Time:

## 2025-03-21 NOTE — ASU DISCHARGE PLAN (ADULT/PEDIATRIC) - NURSING INSTRUCTIONS
Tylenol/acetaminophen------AND/OR------Motrin/ibuprofen  as needed for pain/discomfort.   NEXT DOSE:   Tylenol  OK @  5:30 pm this afternoon, and every 6 hours thereafter, if needed.   Follow up with MD as requested; call office for appointment.

## 2025-03-21 NOTE — ASU DISCHARGE PLAN (ADULT/PEDIATRIC) - FINANCIAL ASSISTANCE
[facility] provides services at a reduced cost to those who are determined to be eligible through [facility]’s financial assistance program. Information regarding [facility]’s financial assistance program can be found by going to [email or web address] or by calling [phone].

## 2025-03-21 NOTE — ASU PATIENT PROFILE, ADULT - FALL HARM RISK - UNIVERSAL INTERVENTIONS
Bed in lowest position, wheels locked, appropriate side rails in place/Call bell, personal items and telephone in reach/Instruct patient to call for assistance before getting out of bed or chair/Non-slip footwear when patient is out of bed/[place] to call system/Physically safe environment - no spills, clutter or unnecessary equipment/Purposeful Proactive Rounding/Room/bathroom lighting operational, light cord in reach

## 2025-03-25 LAB — SURGICAL PATHOLOGY STUDY: SIGNIFICANT CHANGE UP

## 2025-09-08 ENCOUNTER — APPOINTMENT (OUTPATIENT)
Dept: PULMONOLOGY | Facility: CLINIC | Age: 50
End: 2025-09-08
Payer: COMMERCIAL

## 2025-09-08 VITALS
OXYGEN SATURATION: 99 % | WEIGHT: 222 LBS | DIASTOLIC BLOOD PRESSURE: 82 MMHG | BODY MASS INDEX: 39.33 KG/M2 | HEART RATE: 92 BPM | TEMPERATURE: 97.3 F | SYSTOLIC BLOOD PRESSURE: 106 MMHG

## 2025-09-08 DIAGNOSIS — G47.33 OBSTRUCTIVE SLEEP APNEA (ADULT) (PEDIATRIC): ICD-10-CM

## 2025-09-08 PROCEDURE — 99214 OFFICE O/P EST MOD 30 MIN: CPT

## (undated) DEVICE — CURETTE ENDOMETRIAL GYNOSAMPLER 23.6CM

## (undated) DEVICE — NSA-VIDEO TOWER - STRYKER: Type: DURABLE MEDICAL EQUIPMENT

## (undated) DEVICE — GOWN TRIMAX LG

## (undated) DEVICE — DRAPE 1/2 SHEET 40X57"

## (undated) DEVICE — GLV 6.5 PROTEXIS (WHITE)

## (undated) DEVICE — FOLEY CATH 2-WAY 16FR 5CC SILICONE

## (undated) DEVICE — AVETA FLUID MANAGEMENT ACCESSORY

## (undated) DEVICE — ACCESSORY AVETA WASTE MANAGEMENT 3MM

## (undated) DEVICE — SOL IRR BAG NS 0.9% 3000ML

## (undated) DEVICE — Device

## (undated) DEVICE — SOL IRR POUR NS 0.9% 500ML

## (undated) DEVICE — POSITIONER FOAM EGG CRATE ULNAR 2PCS (PINK)

## (undated) DEVICE — WARMING BLANKET UPPER ADULT

## (undated) DEVICE — DRAPE LIGHT HANDLE COVER (GREEN)

## (undated) DEVICE — PACK LITHOTOMY

## (undated) DEVICE — VENODYNE/SCD SLEEVE CALF MEDIUM

## (undated) DEVICE — OS FINDER

## (undated) DEVICE — PREP BETADINE KIT

## (undated) DEVICE — AVETA CORAL HYSTEROSCOPE 4.6MM DISP